# Patient Record
Sex: FEMALE | Race: WHITE | NOT HISPANIC OR LATINO | URBAN - METROPOLITAN AREA
[De-identification: names, ages, dates, MRNs, and addresses within clinical notes are randomized per-mention and may not be internally consistent; named-entity substitution may affect disease eponyms.]

---

## 2017-10-24 ENCOUNTER — OFFICE (AMBULATORY)
Dept: URBAN - METROPOLITAN AREA CLINIC 64 | Facility: CLINIC | Age: 67
End: 2017-10-24
Payer: COMMERCIAL

## 2017-10-24 VITALS
SYSTOLIC BLOOD PRESSURE: 157 MMHG | WEIGHT: 158 LBS | DIASTOLIC BLOOD PRESSURE: 78 MMHG | HEART RATE: 64 BPM | HEIGHT: 64 IN

## 2017-10-24 DIAGNOSIS — K21.9 GASTRO-ESOPHAGEAL REFLUX DISEASE WITHOUT ESOPHAGITIS: ICD-10-CM

## 2017-10-24 PROCEDURE — 99212 OFFICE O/P EST SF 10 MIN: CPT | Performed by: INTERNAL MEDICINE

## 2017-10-24 RX ORDER — OMEPRAZOLE 20 MG/1
40 CAPSULE, DELAYED RELEASE ORAL
Qty: 180 | Refills: 3 | Status: ACTIVE
Start: 2015-07-09

## 2018-03-01 ENCOUNTER — OFFICE VISIT CONVERTED (OUTPATIENT)
Dept: OTHER | Facility: HOSPITAL | Age: 68
End: 2018-03-01
Attending: INTERNAL MEDICINE

## 2018-03-22 ENCOUNTER — OFFICE VISIT CONVERTED (OUTPATIENT)
Dept: OTHER | Facility: HOSPITAL | Age: 68
End: 2018-03-22
Attending: INTERNAL MEDICINE

## 2020-01-09 ENCOUNTER — OFFICE (AMBULATORY)
Dept: URBAN - METROPOLITAN AREA CLINIC 64 | Facility: CLINIC | Age: 70
End: 2020-01-09

## 2020-01-09 VITALS
DIASTOLIC BLOOD PRESSURE: 99 MMHG | HEART RATE: 73 BPM | WEIGHT: 151 LBS | SYSTOLIC BLOOD PRESSURE: 174 MMHG | HEIGHT: 64 IN

## 2020-01-09 DIAGNOSIS — K59.00 CONSTIPATION, UNSPECIFIED: ICD-10-CM

## 2020-01-09 DIAGNOSIS — Z80.9 FAMILY HISTORY OF MALIGNANT NEOPLASM, UNSPECIFIED: ICD-10-CM

## 2020-01-09 DIAGNOSIS — R19.4 CHANGE IN BOWEL HABIT: ICD-10-CM

## 2020-01-09 DIAGNOSIS — K62.5 HEMORRHAGE OF ANUS AND RECTUM: ICD-10-CM

## 2020-01-09 PROCEDURE — 99213 OFFICE O/P EST LOW 20 MIN: CPT | Performed by: NURSE PRACTITIONER

## 2020-02-11 ENCOUNTER — ON CAMPUS - OUTPATIENT (AMBULATORY)
Dept: URBAN - METROPOLITAN AREA HOSPITAL 2 | Facility: HOSPITAL | Age: 70
End: 2020-02-11

## 2020-02-11 ENCOUNTER — OFFICE (AMBULATORY)
Dept: URBAN - METROPOLITAN AREA PATHOLOGY 4 | Facility: PATHOLOGY | Age: 70
End: 2020-02-11

## 2020-02-11 ENCOUNTER — OFFICE (AMBULATORY)
Dept: URBAN - METROPOLITAN AREA PATHOLOGY 4 | Facility: PATHOLOGY | Age: 70
End: 2020-02-11
Payer: COMMERCIAL

## 2020-02-11 VITALS
OXYGEN SATURATION: 97 % | HEART RATE: 105 BPM | HEART RATE: 83 BPM | HEART RATE: 92 BPM | DIASTOLIC BLOOD PRESSURE: 72 MMHG | SYSTOLIC BLOOD PRESSURE: 92 MMHG | DIASTOLIC BLOOD PRESSURE: 95 MMHG | SYSTOLIC BLOOD PRESSURE: 96 MMHG | DIASTOLIC BLOOD PRESSURE: 73 MMHG | SYSTOLIC BLOOD PRESSURE: 101 MMHG | RESPIRATION RATE: 16 BRPM | HEIGHT: 64 IN | SYSTOLIC BLOOD PRESSURE: 160 MMHG | DIASTOLIC BLOOD PRESSURE: 68 MMHG | OXYGEN SATURATION: 96 % | DIASTOLIC BLOOD PRESSURE: 58 MMHG | HEART RATE: 95 BPM | HEART RATE: 99 BPM | SYSTOLIC BLOOD PRESSURE: 132 MMHG | DIASTOLIC BLOOD PRESSURE: 62 MMHG | DIASTOLIC BLOOD PRESSURE: 84 MMHG | HEART RATE: 103 BPM | SYSTOLIC BLOOD PRESSURE: 110 MMHG | OXYGEN SATURATION: 99 % | HEART RATE: 106 BPM | OXYGEN SATURATION: 100 % | HEART RATE: 104 BPM | SYSTOLIC BLOOD PRESSURE: 117 MMHG | DIASTOLIC BLOOD PRESSURE: 56 MMHG | TEMPERATURE: 97.5 F | WEIGHT: 148 LBS | HEART RATE: 109 BPM | DIASTOLIC BLOOD PRESSURE: 70 MMHG | RESPIRATION RATE: 18 BRPM | SYSTOLIC BLOOD PRESSURE: 112 MMHG

## 2020-02-11 DIAGNOSIS — D12.0 BENIGN NEOPLASM OF CECUM: ICD-10-CM

## 2020-02-11 DIAGNOSIS — R19.4 CHANGE IN BOWEL HABIT: ICD-10-CM

## 2020-02-11 LAB
GI HISTOLOGY: A. UNSPECIFIED: (no result)
GI HISTOLOGY: PDF REPORT: (no result)

## 2020-02-11 PROCEDURE — 45385 COLONOSCOPY W/LESION REMOVAL: CPT | Performed by: INTERNAL MEDICINE

## 2020-02-11 PROCEDURE — 88305 TISSUE EXAM BY PATHOLOGIST: CPT | Mod: 26 | Performed by: INTERNAL MEDICINE

## 2020-09-04 ENCOUNTER — OFFICE (AMBULATORY)
Dept: URBAN - METROPOLITAN AREA CLINIC 64 | Facility: CLINIC | Age: 70
End: 2020-09-04
Payer: COMMERCIAL

## 2020-09-04 VITALS
HEIGHT: 64 IN | SYSTOLIC BLOOD PRESSURE: 119 MMHG | HEART RATE: 86 BPM | WEIGHT: 164 LBS | DIASTOLIC BLOOD PRESSURE: 111 MMHG

## 2020-09-04 DIAGNOSIS — K64.8 OTHER HEMORRHOIDS: ICD-10-CM

## 2020-09-04 PROCEDURE — 99214 OFFICE O/P EST MOD 30 MIN: CPT | Performed by: INTERNAL MEDICINE

## 2020-11-09 ENCOUNTER — OFFICE (AMBULATORY)
Dept: URBAN - METROPOLITAN AREA CLINIC 64 | Facility: CLINIC | Age: 70
End: 2020-11-09
Payer: COMMERCIAL

## 2020-11-09 VITALS — HEIGHT: 64 IN

## 2020-11-09 DIAGNOSIS — K60.2 ANAL FISSURE, UNSPECIFIED: ICD-10-CM

## 2020-11-09 PROCEDURE — 99214 OFFICE O/P EST MOD 30 MIN: CPT | Performed by: INTERNAL MEDICINE

## 2021-05-28 VITALS
HEIGHT: 64 IN | WEIGHT: 163.5 LBS | OXYGEN SATURATION: 96 % | BODY MASS INDEX: 27.91 KG/M2 | HEART RATE: 69 BPM | TEMPERATURE: 97.5 F | RESPIRATION RATE: 16 BRPM | DIASTOLIC BLOOD PRESSURE: 86 MMHG | SYSTOLIC BLOOD PRESSURE: 143 MMHG

## 2021-05-28 NOTE — CONSULTS
Patient: THA TOMLIN     Acct: [id number]     Report: #[id number]  UNIT #: [id number]     : 1950    Encounter Date:2018  PRIMARY CARE: UMAIR DAVIDSON  ***Signed***  --------------------------------------------------------------------------------------------------------------------  Consult      DATE: 3/1/18      Referring Physician      Umair Davidson      Reason For Consult      Low white count            History of Present Illness      67-year-old white female had been followed up at the [facility] and on 3     occasions was found to have a low white count.  On 2017 white count was     3.5 hemoglobin hematocrit and platelet count were normal.  Patient's     differential was normal.  On January 10 white count was 4.6, on      white count was 3.8.  On both occasions hemoglobin hematocrit and platelet     counts were within normal limits.            She claims that she usually had some kind of infection, cold or bladder     infection, when she has the blood drawn.      Presently patient's complaint of head cold and stuffed ears.            Past Medical/Surgical History             Hypertension             No Diabetes Mellitus             No Heart Disease             No Cancer             Other (hyperlipidemia, GERD, history of esophageal ulcer, arthritis)            Right knee replacement      Back surgery      2 childbirths            Pyschiatric History      No Depression, No Anxiety, No Panic Attacks            Social History      Social History:  No Recreational Drug use            Family History      Hypertension, No Diabetes Mellitus, No Heart Disease, No Cancer            Medications      Current Medications      Current Medications Reviewed 3/1/18            Pain Assessment      Description:  Aching            Review of Systems      Abnormal as noted below; all other systems have been reviewed and are negative.      General:  No Anxiety, No Fatigue Scale:, No Pain  Scale:, No Fever, No Other      HEENT:  No Dysphagia, No Hearing Changes, No Rhinorrhea, No Tinnitus, No Visual     Changes, No Nasal Congestion, No Epistaxis, No Other      Respiratory:  No Cough, No Shortness of Air, No Sputum Changes, No Wheezing, No     Hemoptysis, No Congestion, No Other      Cardiovascular:  No Chest Pain, No Pedal Edema, No Orthopnea, No Palpitations,     No Chest Pressure, No Dizziness, No Other      Gastrointestinal:  No Nausea, No Vomiting, No Dysphagia, No Constipation, No     Diarrhea, No Appetite Good, No Appetite Fair, No Appetite Poor, No Early Satiety    , No Other      Genitourinary:  No Nocturia, No Dysuria, No Other      Musculoskeletal:  Aches (once in a while)      Endocrine:  No Heat Intolerance, No Cold Intolerance, No Fatigue, No Blood     Sugar Control, Other (hot flashes)      Hematologic:  No Bleeding, No Bruising, No Swollen Glands, No Other      Allergic/Immunologic:  No Hives, No Throat closing off, No Nasal drip, No Itchy     eyes, No Hay fever, No Other      Psychological:  No Anxiety, No Depression, No Other      Neurological:  Headaches (occasional)            Exam      Constitutional:  No acute distress, Conversant, Pleasant      Eyes:  Anicteric sclerae, Palpebral Conjunctivae (pink), BANDAR      HENT:  Oropharynx clear, No Erythema, Buccal mucosae (pink)      Neck:  Supple, Full Range of Motion      Lungs:  Clear to Ausculation, Normal Respiratory Effort      Cardiovascular:  RRR, No Murmurs, Normal PMI, No Peripheral Edema      Abdomen:  Soft, NABS, No Tenderness      Skin:  Normal temperature, Other (no dermatosis)      Extremities:  No digital cyanosis, Normal gait, Other (no deformity no     limitation of range of motion)      Psychiatric:  Appropriate affect, Intact judgement, AAO x 3      Neurological:  Cranial Nerve II-XII, No Focal Sensory deficits      Lymphatic:  No Neck            Lab Results      Dictated in H        Impression/Problem List       Leukopenia, mild probably benign      Hypertension      Hyperlipidemia GERD with history of esophageal ulcer      Arthritis      Fuchs dystrophy bilateral            Plan      Following tests are obtained:  CBC, CMP, T4 TSH      BEBETO, rheumatoid profile,      Hep B and hep C serology      EBV serology      CMV serology      Homocystine level      Methylmalonic acid level      Immunofixation      Thank you very much for allowing me to participate in the care of your patient.      I will keep you posted on the progress of her workup.                 Disclaimer: Converted document may not contain table formatting or lab diagrams. Please see [employer] [facility] for the authenticated document.

## 2021-05-28 NOTE — PROGRESS NOTES
Patient: THA TOMLIN     Acct: [id number]     Report: #[id number]  UNIT #: [id number]     : 1950    Encounter Date:2018  PRIMARY CARE: KAROL GARRETT  ***Signed***  --------------------------------------------------------------------------------------------------------------------  DATE: 3/22/18      Primary Care Provider      Primary Care Provider:  KAROL GARRETT            Referring Physician      Referring Provider:  KAROL GARRETT            Chief Complaint      FU low WBC            Subjective      67-year-old white female had been referred because of leukopenia.  Patient is     here for results of her workup.  Patient is feeling fine; she has gotten over     head cold and stuffy ears            Past Med/Surg History            Past Med/Surg History:   Hypertension             No Diabetes Mellitus             No Heart Disease             No Cancer             Other (hyperlipidemia, GERD, history of esophageal ulcer, arthritis)            Social History      Social History:  No Tobacco Use, No Alcohol Use, No Recreational Drug use, No     Other            Allergies      Coded Allergies:             No Known Drug Allergies (Verified  Allergy, 3/22/18)            Medications      Meloxicam (Meloxicam*) 15 Mg Tablet      15 MG PO QDAY, #30 TAB 0 Refills         Reported         3/2/18       Cyanocobalamin (Vitamin B-12*) 2,500 Mcg Tab.subl      2500 MCG SL QDAY, TAB         Reported         3/2/18       Omeprazole (Omeprazole*) 20 Mg Tablet.dr      20 MG PO BID, #60 CAP 0 Refills         Reported         3/2/18       Cetirizine HCl (Cetirizine HCl*) 10 Mg Tablet      10 MG PO QDAY, #30 TAB 0 Refills         Reported         3/2/18       Calcium Carb/Vit D3 (Calcium Carb 600 + D) 1 Each Tablet      1 EACH PO QDAY, #60 TAB 0 Refills         Reported         3/2/18       Atorvastatin Calcium (Lipitor*) 20 Mg Tablet      20 MG PO QDAY, #30 TAB 0 Refills         Reported         3/2/18        Losartan Potassium (Losartan*) 100 Mg Tablet      100 MG PO QDAY, #30 TAB 0 Refills         Reported         3/2/18      Current Medications      Current Medications Reviewed 3/22/18            Pain Assessment      Pain Intensity:  0      Description:  None            Review of Systems      General:  No Anxiety, No Fatigue Scale:, No Pain Scale:, No Fever, No Other      HEENT:  No Dysphagia, No Hearing Changes, No Rhinorrhea, No Tinnitus, No Visual     Changes, No Nasal Congestion, No Epistaxis, No Other      Respiratory:  No Cough, No Shortness of Air, No Sputum Changes, No Wheezing, No     Hemoptysis, No Congestion, No Other      Cardiovascular:  No Chest Pain, No Pedal Edema, No Orthopnea, No Palpitations,     No Chest Pressure, No Dizziness, No Other      Gastrointestinal:  No Nausea, No Vomiting, No Dysphagia, No Constipation, No     Diarrhea, Appetite Good, No Appetite Fair, No Appetite Poor, No Early Satiety,     No Other      Genitourinary:  No Nocturia, No Dysuria, No Other      Musculoskeletal:  No Joint Effusions, No Joint Tenderness, No Joint Stiffness,     No Myalgias, No Aches, No Pains, No Other      Endocrine:  No Heat Intolerance, No Cold Intolerance, No Fatigue, No Blood     Sugar Control, No Other      Hematologic:  No Bleeding, No Bruising, No Swollen Glands, No Other      Allergic/Immunologic:  No Hives, No Throat closing off, No Nasal drip, No Itchy     eyes, No Hay fever, No Other      Psychological:  No Anxiety, No Depression, No Other      Neurological:  No Headaches, No Dizziness, No Weakness, No Numbness, No Other      Skin:  No Rash, No Open Wounds, No Edema, No Other            Vitals      Height 5 ft 4 in / 162.56 cm      Weight 163 lbs 8 oz / 74.072001 kg      BSA 1.85 m2      BMI 28.1 kg/m2      Temperature 97.5 F / 36.39 C - Oral      Pulse 69      Respirations 16      Blood Pressure 143/86 Sitting, Left Arm      Pulse Oximetry 96%, room air            Exam      Constitutional:  No  acute distress, Conversant, Pleasant, No Weakness      Eyes:  Anicteric sclerae, Palpebral Conjunctivae (pink), BANDAR      HENT:  Oropharynx clear, No Erythema, No Tonsils, Buccal mucosae (pink)      Neck:  Supple, Full Range of Motion      Cardiovascular:  RRR, No Murmurs, Normal PMI, No Peripheral Edema      Lungs:  Clear to Ausculation, Normal Respiratory Effort, No Rhonchi, No Crackles    , No Wheezes      Abdomen:  Soft, NABS, No Masses, No Tenderness      Chest:  Other (symmetrical and equal)      Lymphatic:  No Neck      Extremities:  No digital cyanosis, Normal gait, Other (no deformity, no     limitation range of motion)      Neurological:  Cranial Nerve II-XII (Intact), No Focal Sensory deficits      Psychological:  Intact judgement, Alert      Skin:  Normal temperature, Other (no dermatosis)            Lab Results      BEBETO negative       Hepatitis B serology negative      Homocystine level normal, methylmalonic acid normal      Ebstein-Barr virus virus showed past infection      Immunofixation showed no monoclonality      CMP showed a BUN of 26 rest were normal      Thyroxine level normal, TSH normal      Cytomegalovirus negative      CBC 3.8, hemoglobin hematocrit normal, platelet count normal, differential     normal      RA screen negative            Impression/Problem List      Leukopenia, mild, probably benign      Hypertension      Hyperlipidemia       GERD with history of esophageal ulcer      Arthritis      Fuchs dystrophy bilateral            Plan      CBC every 6 months      Bone marrow studies will be done as appropriate            Hx Influenza Vaccination:  Yes      Date Influenza Vaccine Given:  Oct 27, 2017      Influenza Vaccine Declined:  No      2 or More Falls Past Year?:  No      Fall Past Year with Injury?:  No      Hx Pneumococcal Vaccination:  Yes      Encouraged to follow-up with:  PCP regarding preventative exams.      Chart initiated by      Katherine Hernandes MA                  Disclaimer: Converted document may not contain table formatting or lab diagrams. Please see [employer] [facility] for the authenticated document.